# Patient Record
Sex: FEMALE
[De-identification: names, ages, dates, MRNs, and addresses within clinical notes are randomized per-mention and may not be internally consistent; named-entity substitution may affect disease eponyms.]

---
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
## 2020-02-20 ENCOUNTER — APPOINTMENT (OUTPATIENT)
Dept: PEDIATRICS | Facility: CLINIC | Age: 20
End: 2020-02-20
Payer: COMMERCIAL

## 2020-02-20 ENCOUNTER — RESULT CHARGE (OUTPATIENT)
Age: 20
End: 2020-02-20

## 2020-02-20 VITALS — TEMPERATURE: 100.6 F

## 2020-02-20 PROCEDURE — 99214 OFFICE O/P EST MOD 30 MIN: CPT

## 2020-02-20 PROCEDURE — 87804 INFLUENZA ASSAY W/OPTIC: CPT | Mod: QW

## 2020-02-20 PROCEDURE — 87880 STREP A ASSAY W/OPTIC: CPT | Mod: QW

## 2020-02-20 RX ORDER — AMOXICILLIN AND CLAVULANATE POTASSIUM 500; 125 MG/1; MG/1
500-125 TABLET, FILM COATED ORAL
Qty: 1 | Refills: 0 | Status: COMPLETED | COMMUNITY
Start: 2019-11-08 | End: 2020-02-20

## 2020-02-20 NOTE — REVIEW OF SYSTEMS
[Fever] : fever [Headache] : headache [Nasal Discharge] : nasal discharge [Nasal Congestion] : nasal congestion [Sore Throat] : sore throat [Cough] : cough [Appetite Changes] : appetite changes [Congestion] : congestion [Myalgia] : myalgia [Negative] : Genitourinary

## 2020-02-20 NOTE — PHYSICAL EXAM
[No Acute Distress] : no acute distress [Clear Rhinorrhea] : clear rhinorrhea [Erythematous Oropharynx] : erythematous oropharynx [Clear to Auscultation Bilaterally] : clear to auscultation bilaterally [Soft] : soft [NonTender] : non tender [Moves All Extremities x 4] : moves all extremities x4 [FreeTextEntry7] : harsh cough intermittent coarse breath sounds [NL] : warm

## 2020-02-20 NOTE — DISCUSSION/SUMMARY
[FreeTextEntry1] : 20-year-old with upper respiratory symptoms, sore throat headache fever. Cough consistent with bronchitis. Pharyngitis on exam but rapid strep test was negative. Patient is positive for influenza A.. Recommend supportive care including antipyretics, fluids, and nasal saline followed by nasal suction. Discussed risks/benefits of Tamiflu.does not wish to have prescription for Tamiflu. advised treatment of URI by using normal saline drops ,May use over-the-counter cough and cold preparations as needed humidifier, steam, and increasing fluids.Tylenol Motrin for fever and sore throat pain and muscle aches. Advise warm salt water gargles as needed for sore throat, half a teaspoon of salt to 1 cup of warm water gargle as desired. Advise not to snare glasses or eating utensils and to wash hands frequently. patient is not to return to school until fever free for 24 hours if there is no improvement in pain fever etc. in 2 days patient is to return to office may give Tylenol or Motrin for fever and/or pain Tylenol as every 4 hours ibuprofen would be every 6-8 hours. Increase fluids. May lubricate throat with drinking fluids sore throat lozenges and sucking candies. \par \par \par

## 2020-02-20 NOTE — HISTORY OF PRESENT ILLNESS
[FreeTextEntry6] : 20-year-old female here for complaint of fever for 2 days. Loose cough .muscle aches. Temperature max was 102 yesterday. 100.6 today. Patient has a very harsh, productive cough. She has been exposed to strep and influenza. Also with nasal congestion and runny nose headache and sore throat.

## 2020-02-23 LAB — BACTERIA THROAT CULT: NORMAL

## 2020-08-06 RX ORDER — AZITHROMYCIN 250 MG/1
250 TABLET, FILM COATED ORAL
Qty: 1 | Refills: 0 | Status: COMPLETED | COMMUNITY
Start: 2020-02-20 | End: 2020-08-06

## 2020-08-08 ENCOUNTER — APPOINTMENT (OUTPATIENT)
Dept: PEDIATRICS | Facility: CLINIC | Age: 20
End: 2020-08-08
Payer: COMMERCIAL

## 2020-08-08 VITALS
TEMPERATURE: 98 F | DIASTOLIC BLOOD PRESSURE: 62 MMHG | BODY MASS INDEX: 22.63 KG/M2 | WEIGHT: 118.3 LBS | SYSTOLIC BLOOD PRESSURE: 108 MMHG | HEART RATE: 74 BPM | HEIGHT: 60.5 IN | RESPIRATION RATE: 18 BRPM

## 2020-08-08 DIAGNOSIS — R05 COUGH: ICD-10-CM

## 2020-08-08 DIAGNOSIS — B27.90 INFECTIOUS MONONUCLEOSIS, UNSPECIFIED W/OUT COMPLICATION: ICD-10-CM

## 2020-08-08 DIAGNOSIS — J34.89 NASAL CONGESTION: ICD-10-CM

## 2020-08-08 DIAGNOSIS — R50.9 FEVER, UNSPECIFIED: ICD-10-CM

## 2020-08-08 DIAGNOSIS — J10.1 INFLUENZA DUE TO OTHER IDENTIFIED INFLUENZA VIRUS WITH OTHER RESPIRATORY MANIFESTATIONS: ICD-10-CM

## 2020-08-08 DIAGNOSIS — Z20.818 CONTACT WITH AND (SUSPECTED) EXPOSURE TO OTHER BACTERIAL COMMUNICABLE DISEASES: ICD-10-CM

## 2020-08-08 DIAGNOSIS — Z87.898 PERSONAL HISTORY OF OTHER SPECIFIED CONDITIONS: ICD-10-CM

## 2020-08-08 DIAGNOSIS — R09.81 NASAL CONGESTION: ICD-10-CM

## 2020-08-08 DIAGNOSIS — Z87.09 PERSONAL HISTORY OF OTHER DISEASES OF THE RESPIRATORY SYSTEM: ICD-10-CM

## 2020-08-08 DIAGNOSIS — Z20.828 CONTACT WITH AND (SUSPECTED) EXPOSURE TO OTHER VIRAL COMMUNICABLE DISEASES: ICD-10-CM

## 2020-08-08 DIAGNOSIS — F32.0 MAJOR DEPRESSIVE DISORDER, SINGLE EPISODE, MILD: ICD-10-CM

## 2020-08-08 DIAGNOSIS — J35.1 HYPERTROPHY OF TONSILS: ICD-10-CM

## 2020-08-08 DIAGNOSIS — Z87.39 PERSONAL HISTORY OF OTHER DISEASES OF THE MUSCULOSKELETAL SYSTEM AND CONNECTIVE TISSUE: ICD-10-CM

## 2020-08-08 DIAGNOSIS — Z00.00 ENCOUNTER FOR GENERAL ADULT MEDICAL EXAMINATION W/OUT ABNORMAL FINDINGS: ICD-10-CM

## 2020-08-08 DIAGNOSIS — L60.0 INGROWING NAIL: ICD-10-CM

## 2020-08-08 DIAGNOSIS — J01.10 ACUTE FRONTAL SINUSITIS, UNSPECIFIED: ICD-10-CM

## 2020-08-08 DIAGNOSIS — Z86.69 PERSONAL HISTORY OF OTHER DISEASES OF THE NERVOUS SYSTEM AND SENSE ORGANS: ICD-10-CM

## 2020-08-08 DIAGNOSIS — L03.039 CELLULITIS OF UNSPECIFIED TOE: ICD-10-CM

## 2020-08-08 PROCEDURE — 96127 BRIEF EMOTIONAL/BEHAV ASSMT: CPT

## 2020-08-08 PROCEDURE — 92551 PURE TONE HEARING TEST AIR: CPT

## 2020-08-08 PROCEDURE — 96160 PT-FOCUSED HLTH RISK ASSMT: CPT | Mod: 59

## 2020-08-08 PROCEDURE — 99395 PREV VISIT EST AGE 18-39: CPT

## 2020-08-08 RX ORDER — FLUOXETINE HYDROCHLORIDE 20 MG/1
20 CAPSULE ORAL
Qty: 30 | Refills: 0 | Status: COMPLETED | COMMUNITY
Start: 2019-09-25 | End: 2020-08-08

## 2020-08-08 RX ORDER — FLUOXETINE HYDROCHLORIDE 10 MG/1
10 CAPSULE ORAL
Qty: 30 | Refills: 0 | Status: COMPLETED | COMMUNITY
Start: 2019-08-29 | End: 2020-08-08

## 2020-08-08 RX ORDER — DAPSONE 50 MG/G
5 GEL TOPICAL
Qty: 60 | Refills: 0 | Status: COMPLETED | COMMUNITY
Start: 2019-01-09 | End: 2020-08-08

## 2020-08-08 NOTE — DISCUSSION/SUMMARY
[FreeTextEntry1] : Patient is a 20-year-old female who was doing well and is here for a routine visit. She has a history of mild anxiety and depression for which she is followed by psychiatrist every 3 months and a counselor at therapy sessions once a week. She feels this is all helpful but at times still feels a little anxious and sad. She had one episode in the past of suicide attempt but none recently.. She has a history of pulmonary emboli this has been stable. 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 Immunizations are up to date. She is currently in college and doing well. She is sexually active but only uses condoms as hormonal therapy is contraindicated. Also followed by a GYN as needed.. 20 year female here for well visit. Normal growth and development observed. Recommend balanced diet with all food groups. Brush teeth twice daily and recommend visiting dentist twice per year for cleaning. Maintain consistent daily routines and sleep schedule. Personal hygiene, puberty, and sexual health reviewed. Risky behaviors assessed. School progress discussed. Limit screen time to less than 2 hours per day. Encourage physical activity.  Return 1 year for routine well visit.\par I recommended that the patient participates in 60 minutes or more of physical activity a day.  As an older child, I encouraged structured physical activity when possible (ie, participation in team or individual sports, or supervised exercise sessions). I explained that the patient would be more likely to participate consistently in these activities because they would be accountable to a  or leader. I also suggested engaging in a gym or fitness center if possible.  Educational material relating to physical activity was provided to the patient.\par \par

## 2020-08-08 NOTE — HISTORY OF PRESENT ILLNESS
[Up to date] : Up to date [LMP: _____] : LMP: [unfilled] [Normal] : normal [Cycle Length: _____ days] : Cycle Length: [unfilled] days [Days of Bleeding: _____] : Days of bleeding: [unfilled] [Painful Cramps] : painful cramps [Acne] : acne [Has family members/adults to turn to for help] : has family members/adults to turn to for help [Eats meals with family] : eats meals with family [Is permitted and is able to make independent decisions] : Is permitted and is able to make independent decisions [Normal Performance] : normal performance [Grade: ____] : Grade: [unfilled] [Eats regular meals including adequate fruits and vegetables] : eats regular meals including adequate fruits and vegetables [Drinks non-sweetened liquids] : drinks non-sweetened liquids  [Normal Behavior/Attention] : normal behavior/attention [Normal Homework] : normal homework [Has friends] : has friends [Calcium source] : calcium source [At least 1 hour of physical activity a day] : at least 1 hour of physical activity a day [Has interests/participates in community activities/volunteers] : has interests/participates in community activities/volunteers. [Exposure to alcohol] : exposure to alcohol [Drinks alcohol] : drinks alcohol [Uses safety belts/safety equipment] : uses safety belts/safety equipment  [No] : No cigarette smoke exposure [Has peer relationships free of violence] : has peer relationships free of violence [Yes] : Patient has had sexual intercourse. [Has ways to cope with stress] : has ways to cope with stress [Displays self-confidence] : displays self-confidence [Gets depressed, anxious, or irritable/has mood swings] : gets depressed, anxious, or irritable/has mood swings [With Teen] : teen [Irregular menses] : no irregular menses [Heavy Bleeding] : no heavy bleeding [Sleep Concerns] : no sleep concerns [Hirsutism] : no hirsutism [Screen time (except homework) less than 2 hours a day] : no screen time (except homework) less than 2 hours a day [Has concerns about body or appearance] : does not have concerns about body or appearance [Uses electronic nicotine delivery system] : does not use electronic nicotine delivery system [Exposure to tobacco] : no exposure to tobacco [Exposure to electronic nicotine delivery system] : no exposure to electronic nicotine delivery system [Uses tobacco] : does not use tobacco [Exposure to drugs] : no exposure to drugs [Uses drugs] : does not use drugs  [Has problems with sleep] : does not have problems with sleep [Impaired/distracted driving] : no impaired/distracted driving [de-identified] : none [Has thought about hurting self or considered suicide] : has not thought about hurting self or considered suicide [FreeTextEntry7] : 20 year old doing well [FreeTextEntry8] : cramps relieved with Advil [de-identified] : rarely drinks [de-identified] : condoms, cannot use ocp [de-identified] : sees therapist once a week [FreeTextEntry1] : 20-year-old female doing well here for routine visit. Patient has a history of acne for which he is using some skin care products. She is followed by ophthalmology. She is also currently receiving therapy for mild anxiety and depression. She is on fluoxetine 60 mg q. day. She feels this is helpful. She sees a psychiatrist once every 3 months. She sees her counselor for therapy sessions once a week.

## 2020-08-08 NOTE — RISK ASSESSMENT
[0] : 1) Little interest or pleasure doing things: Not at all (0) [FreeTextEntry1] : occasional, brittonenlty on meds and sees therpaist

## 2020-08-08 NOTE — PHYSICAL EXAM
[Alert] : alert [No Acute Distress] : no acute distress [EOMI Bilateral] : EOMI bilateral [Normocephalic] : normocephalic [Nonerythematous Oropharynx] : nonerythematous oropharynx [Clear tympanic membranes with bony landmarks and light reflex present bilaterally] : clear tympanic membranes with bony landmarks and light reflex present bilaterally  [Pink Nasal Mucosa] : pink nasal mucosa [No Palpable Masses] : no palpable masses [Supple, full passive range of motion] : supple, full passive range of motion [Clear to Auscultation Bilaterally] : clear to auscultation bilaterally [Regular Rate and Rhythm] : regular rate and rhythm [Normal S1, S2 audible] : normal S1, S2 audible [No Murmurs] : no murmurs [Soft] : soft [NonTender] : non tender [+2 Femoral Pulses] : +2 femoral pulses [No Hepatomegaly] : no hepatomegaly [Normoactive Bowel Sounds] : normoactive bowel sounds [Non Distended] : non distended [Jona: ____] : Jona [unfilled] [No Splenomegaly] : no splenomegaly [No Gait Asymmetry] : no gait asymmetry [No Abnormal Lymph Nodes Palpated] : no abnormal lymph nodes palpated [Normal Muscle Tone] : normal muscle tone [No pain or deformities with palpation of bone, muscles, joints] : no pain or deformities with palpation of bone, muscles, joints [Straight] : straight [No Scoliosis] : no scoliosis [No Rash or Lesions] : no rash or lesions [Cranial Nerves Grossly Intact] : cranial nerves grossly intact

## 2020-11-09 ENCOUNTER — NON-APPOINTMENT (OUTPATIENT)
Age: 20
End: 2020-11-09

## 2021-02-16 DIAGNOSIS — R79.89 OTHER SPECIFIED ABNORMAL FINDINGS OF BLOOD CHEMISTRY: ICD-10-CM

## 2021-02-26 ENCOUNTER — APPOINTMENT (OUTPATIENT)
Dept: PEDIATRICS | Facility: CLINIC | Age: 21
End: 2021-02-26
Payer: COMMERCIAL

## 2021-02-26 VITALS
DIASTOLIC BLOOD PRESSURE: 78 MMHG | SYSTOLIC BLOOD PRESSURE: 116 MMHG | OXYGEN SATURATION: 100 % | TEMPERATURE: 98.8 F | HEART RATE: 98 BPM | RESPIRATION RATE: 18 BRPM

## 2021-02-26 DIAGNOSIS — Z20.822 CONTACT WITH AND (SUSPECTED) EXPOSURE TO COVID-19: ICD-10-CM

## 2021-02-26 DIAGNOSIS — T88.7XXA UNSPECIFIED ADVERSE EFFECT OF DRUG OR MEDICAMENT, INITIAL ENCOUNTER: ICD-10-CM

## 2021-02-26 DIAGNOSIS — R45.0 NERVOUSNESS: ICD-10-CM

## 2021-02-26 PROCEDURE — 99214 OFFICE O/P EST MOD 30 MIN: CPT

## 2021-02-26 PROCEDURE — 99072 ADDL SUPL MATRL&STAF TM PHE: CPT

## 2021-02-26 NOTE — PHYSICAL EXAM
[EOMI] : EOMI [NL] : warm [FreeTextEntry1] : jittery on exam  [FreeTextEntry5] : fundoscopic normal bl  / PERRLA BL  [de-identified] :  EYES: PERRLA bl, EOM intact bl, CN2-12 grossly intact/ strength 5/5 upper and lower extermities/ Romberg negative/ FInger to nose normal/ Gait and heel to toe walk normal

## 2021-02-26 NOTE — HISTORY OF PRESENT ILLNESS
[FreeTextEntry6] : 21 year old female presents today with being dizzy, nauseous, and headaches x 2weeks  Patient recently had her anxiety medication dose increased by her NP that was when the symptoms started.  AT ONE POINT COULD NOT DRIVE HER CARE FROM DIZZINESS  Her NP wants her to stay on the medication but she does not want to. (80mg and 100mg ) Patient did not take medication today  but still feels dizzy and past 2 days having jitteriness   NO cold intolerances and does not have a chill\par . Patient was also possible exposed to COVID. The aid tested positive on friday 2/19, her mother was tested monday 2/22 and was negative. Mother started having symptoms of fever so she was retested today. Patient is afebrile.  Patient having altered sense of taste only with above issues

## 2021-02-26 NOTE — DISCUSSION/SUMMARY
[FreeTextEntry1] : conor on dizziness/ jitterines-  vitals stable/   COVI  EXPOSURE \par sent for labs\par patient needs to contact her [facility] NP for side efects of medicaitons\par Due to recent exposure  patient possibly has COVID-19 Infection. Signs and symptoms discussed with patient. Patient educated to self isolate in a room in his/her home away from others in household. MUST wear Mask if in public areas of house- in bedroom can open window and not wear mask- if sleeps alone. Patient advised not to leave house for any reason. After using bathroom advise to clean area and after eating and drinking utensils, plates, cups etc needs to be washed \par Self treatment discussed including acetaminophen for fever, pain or myalgia; cough/cold medications for symptoms. Patient to check temperature daily. Monitor symptoms.\par WILL call with COVID PCR results once available \par \par \par

## 2021-02-26 NOTE — REVIEW OF SYSTEMS
[Headache] : headache [Abnormal Movements] : abnormal movements [Lightheadness] : lightheadness [Dizziness] : dizziness [Negative] : Genitourinary [Eye Discharge] : no eye discharge [Ear Pain] : no ear pain [Nasal Discharge] : no nasal discharge [Sinus Pressure] : no sinus pressure [Sore Throat] : no sore throat [Seizure] : no seizures [Weakness] : no weakness

## 2021-02-28 ENCOUNTER — NON-APPOINTMENT (OUTPATIENT)
Age: 21
End: 2021-02-28

## 2021-02-28 LAB — SARS-COV-2 N GENE NPH QL NAA+PROBE: NOT DETECTED

## 2021-04-12 ENCOUNTER — NON-APPOINTMENT (OUTPATIENT)
Age: 21
End: 2021-04-12

## 2021-04-12 LAB
25(OH)D3 SERPL-MCNC: 17.9 NG/ML
ALBUMIN SERPL ELPH-MCNC: 4.3 G/DL
ALP BLD-CCNC: 75 U/L
ALT SERPL-CCNC: 10 U/L
ANION GAP SERPL CALC-SCNC: 9 MMOL/L
APPEARANCE: CLEAR
AST SERPL-CCNC: 17 U/L
BACTERIA: NEGATIVE
BASOPHILS # BLD AUTO: 0.04 K/UL
BASOPHILS NFR BLD AUTO: 0.7 %
BILIRUB SERPL-MCNC: 0.6 MG/DL
BILIRUBIN URINE: NEGATIVE
BLOOD URINE: NEGATIVE
BUN SERPL-MCNC: 10 MG/DL
CALCIUM SERPL-MCNC: 9.5 MG/DL
CHLORIDE SERPL-SCNC: 107 MMOL/L
CHOLEST SERPL-MCNC: 139 MG/DL
CO2 SERPL-SCNC: 23 MMOL/L
COLOR: YELLOW
CREAT SERPL-MCNC: 0.79 MG/DL
EOSINOPHIL # BLD AUTO: 0.03 K/UL
EOSINOPHIL NFR BLD AUTO: 0.5 %
GLUCOSE QUALITATIVE U: NEGATIVE
GLUCOSE SERPL-MCNC: 90 MG/DL
HCT VFR BLD CALC: 36.1 %
HDLC SERPL-MCNC: 54 MG/DL
HGB BLD-MCNC: 11.3 G/DL
HYALINE CASTS: 4 /LPF
IMM GRANULOCYTES NFR BLD AUTO: 0.3 %
KETONES URINE: NEGATIVE
LDLC SERPL CALC-MCNC: 74 MG/DL
LEUKOCYTE ESTERASE URINE: NEGATIVE
LYMPHOCYTES # BLD AUTO: 2 K/UL
LYMPHOCYTES NFR BLD AUTO: 32.9 %
MAN DIFF?: NORMAL
MCHC RBC-ENTMCNC: 27.8 PG
MCHC RBC-ENTMCNC: 31.3 GM/DL
MCV RBC AUTO: 88.9 FL
MICROSCOPIC-UA: NORMAL
MONOCYTES # BLD AUTO: 0.38 K/UL
MONOCYTES NFR BLD AUTO: 6.3 %
NEUTROPHILS # BLD AUTO: 3.6 K/UL
NEUTROPHILS NFR BLD AUTO: 59.3 %
NITRITE URINE: NEGATIVE
NONHDLC SERPL-MCNC: 85 MG/DL
PH URINE: 6
PLATELET # BLD AUTO: 241 K/UL
POTASSIUM SERPL-SCNC: 4.5 MMOL/L
PROT SERPL-MCNC: 6.6 G/DL
PROTEIN URINE: NORMAL
RBC # BLD: 4.06 M/UL
RBC # FLD: 12.9 %
RED BLOOD CELLS URINE: 4 /HPF
SODIUM SERPL-SCNC: 139 MMOL/L
SPECIFIC GRAVITY URINE: 1.03
SQUAMOUS EPITHELIAL CELLS: 4 /HPF
TRIGL SERPL-MCNC: 58 MG/DL
UROBILINOGEN URINE: NORMAL
WBC # FLD AUTO: 6.07 K/UL
WHITE BLOOD CELLS URINE: 1 /HPF

## 2021-04-14 LAB
T3 SERPL-MCNC: 99 NG/DL
T4 SERPL-MCNC: 4.4 UG/DL
TSH SERPL-ACNC: 1.21 UIU/ML
VIT B12 SERPL-MCNC: 939 PG/ML

## 2021-04-19 LAB — VIT B6 SERPL-MCNC: 9.4 UG/L

## 2021-07-23 ENCOUNTER — NON-APPOINTMENT (OUTPATIENT)
Age: 21
End: 2021-07-23

## 2021-07-24 ENCOUNTER — APPOINTMENT (OUTPATIENT)
Dept: PEDIATRICS | Facility: CLINIC | Age: 21
End: 2021-07-24
Payer: COMMERCIAL

## 2021-07-24 ENCOUNTER — LABORATORY RESULT (OUTPATIENT)
Age: 21
End: 2021-07-24

## 2021-07-24 ENCOUNTER — NON-APPOINTMENT (OUTPATIENT)
Age: 21
End: 2021-07-24

## 2021-07-24 VITALS — SYSTOLIC BLOOD PRESSURE: 112 MMHG | DIASTOLIC BLOOD PRESSURE: 66 MMHG | TEMPERATURE: 98.3 F

## 2021-07-24 DIAGNOSIS — R42 DIZZINESS AND GIDDINESS: ICD-10-CM

## 2021-07-24 LAB
ALBUMIN SERPL ELPH-MCNC: 4.8 G/DL
ALP BLD-CCNC: 84 U/L
ALT SERPL-CCNC: 10 U/L
ANION GAP SERPL CALC-SCNC: 11 MMOL/L
APPEARANCE: ABNORMAL
AST SERPL-CCNC: 17 U/L
BASOPHILS # BLD AUTO: 0.03 K/UL
BASOPHILS NFR BLD AUTO: 0.5 %
BILIRUB SERPL-MCNC: 1 MG/DL
BILIRUBIN URINE: NEGATIVE
BLOOD URINE: NEGATIVE
BUN SERPL-MCNC: 8 MG/DL
CALCIUM SERPL-MCNC: 10.3 MG/DL
CHLORIDE SERPL-SCNC: 104 MMOL/L
CHOLEST SERPL-MCNC: 147 MG/DL
CO2 SERPL-SCNC: 25 MMOL/L
COLOR: YELLOW
CREAT SERPL-MCNC: 0.83 MG/DL
EOSINOPHIL # BLD AUTO: 0.01 K/UL
EOSINOPHIL NFR BLD AUTO: 0.2 %
GLUCOSE QUALITATIVE U: NEGATIVE
GLUCOSE SERPL-MCNC: 102 MG/DL
HCT VFR BLD CALC: 40.7 %
HDLC SERPL-MCNC: 62 MG/DL
HGB BLD-MCNC: 12.8 G/DL
IMM GRANULOCYTES NFR BLD AUTO: 0.2 %
KETONES URINE: NEGATIVE
LDLC SERPL CALC-MCNC: 74 MG/DL
LEUKOCYTE ESTERASE URINE: NEGATIVE
LYMPHOCYTES # BLD AUTO: 1.76 K/UL
LYMPHOCYTES NFR BLD AUTO: 29.1 %
MAN DIFF?: NORMAL
MCHC RBC-ENTMCNC: 27.5 PG
MCHC RBC-ENTMCNC: 31.4 GM/DL
MCV RBC AUTO: 87.5 FL
MONOCYTES # BLD AUTO: 0.33 K/UL
MONOCYTES NFR BLD AUTO: 5.5 %
NEUTROPHILS # BLD AUTO: 3.9 K/UL
NEUTROPHILS NFR BLD AUTO: 64.5 %
NITRITE URINE: NEGATIVE
NONHDLC SERPL-MCNC: 85 MG/DL
PH URINE: 6
PLATELET # BLD AUTO: 243 K/UL
POTASSIUM SERPL-SCNC: 5.2 MMOL/L
PROT SERPL-MCNC: 7.3 G/DL
PROTEIN URINE: NORMAL
RBC # BLD: 4.65 M/UL
RBC # FLD: 13 %
S PYO AG SPEC QL IA: NORMAL
SODIUM SERPL-SCNC: 141 MMOL/L
SPECIFIC GRAVITY URINE: 1.03
T3 SERPL-MCNC: 117 NG/DL
T4 SERPL-MCNC: 6.2 UG/DL
TRIGL SERPL-MCNC: 56 MG/DL
TSH SERPL-ACNC: 1.29 UIU/ML
UROBILINOGEN URINE: NORMAL
WBC # FLD AUTO: 6.04 K/UL

## 2021-07-24 PROCEDURE — 99072 ADDL SUPL MATRL&STAF TM PHE: CPT

## 2021-07-24 PROCEDURE — 87880 STREP A ASSAY W/OPTIC: CPT | Mod: QW

## 2021-07-24 PROCEDURE — 99214 OFFICE O/P EST MOD 30 MIN: CPT | Mod: 25

## 2021-07-24 RX ORDER — HYDROXYZINE HYDROCHLORIDE 25 MG/1
25 TABLET ORAL
Qty: 90 | Refills: 0 | Status: DISCONTINUED | COMMUNITY
Start: 2019-07-18 | End: 2021-07-24

## 2021-07-24 RX ORDER — FLUOXETINE HYDROCHLORIDE 20 MG/1
20 CAPSULE ORAL
Refills: 0 | Status: DISCONTINUED | COMMUNITY
End: 2021-07-24

## 2021-07-24 NOTE — REVIEW OF SYSTEMS
[Appetite Changes] : appetite changes [Negative] : Genitourinary [Vomiting] : no vomiting [Diarrhea] : no diarrhea [FreeTextEntry1] : nausea

## 2021-07-24 NOTE — DISCUSSION/SUMMARY
[FreeTextEntry1] : This patient has been diagnosed with a Viral Syndrome/ nausea/ pharyngitis.\par Rapid strep was negative.\par sent for blood work ro ebv reactivation.\par No Covid done, will have rapid on Monday if not better.\par For nausea to try to eat and to take Mylanta 4 x a day and try gingerale.\par Denies Sexual activity.\par The Parent was  advised to use saline nose drops and symptomatic relief  if indicated to alleviate symptoms. May use OTC products if age appropriate.\par Advised to encourage fluids and to monitor for fever. Should temperature develop and symptoms worsen or fail to improve over the next 48-72 hours parents are  to contact the office.for further evaluation.\par \par

## 2021-07-24 NOTE — PHYSICAL EXAM
[Erythematous Oropharynx] : erythematous oropharynx [NL] : warm [de-identified] : has 1 tonsil enlarged on the left, no pus, hx of ebv in the past

## 2021-07-24 NOTE — HISTORY OF PRESENT ILLNESS
[FreeTextEntry6] : 21 year old female presents today with nausea for one week. Patient also stated she felt like her throat was swollen. Patient is afebrile.  Denies Ha bellyache , no diarhea or fever , no sick or covid contacts . She is fully vaccinated.\par has had no apatite and feels like  she will pass out ay times , does not happen every day,\par

## 2021-07-26 ENCOUNTER — NON-APPOINTMENT (OUTPATIENT)
Age: 21
End: 2021-07-26

## 2021-07-26 LAB
BACTERIA THROAT CULT: NORMAL
EBV EA AB SER IA-ACNC: <5 U/ML
EBV EA AB TITR SER IF: POSITIVE
EBV EA IGG SER QL IA: 100 U/ML
EBV EA IGG SER-ACNC: NEGATIVE
EBV EA IGM SER IA-ACNC: NEGATIVE
EBV PATRN SPEC IB-IMP: NORMAL
EBV VCA IGG SER IA-ACNC: 111 U/ML
EBV VCA IGM SER QL IA: 20.2 U/ML
EPSTEIN-BARR VIRUS CAPSID ANTIGEN IGG: POSITIVE
ERYTHROCYTE [SEDIMENTATION RATE] IN BLOOD BY WESTERGREN METHOD: 3 MM/HR

## 2021-07-28 ENCOUNTER — APPOINTMENT (OUTPATIENT)
Dept: PEDIATRICS | Facility: CLINIC | Age: 21
End: 2021-07-28
Payer: COMMERCIAL

## 2021-07-28 VITALS — TEMPERATURE: 99 F

## 2021-07-28 DIAGNOSIS — R11.0 NAUSEA: ICD-10-CM

## 2021-07-28 DIAGNOSIS — F41.9 ANXIETY DISORDER, UNSPECIFIED: ICD-10-CM

## 2021-07-28 DIAGNOSIS — R19.7 DIARRHEA, UNSPECIFIED: ICD-10-CM

## 2021-07-28 DIAGNOSIS — J02.9 ACUTE PHARYNGITIS, UNSPECIFIED: ICD-10-CM

## 2021-07-28 PROCEDURE — ZZZZZ: CPT

## 2021-07-28 PROCEDURE — 99214 OFFICE O/P EST MOD 30 MIN: CPT

## 2021-07-28 RX ORDER — LAMOTRIGINE 100 MG/1
100 TABLET ORAL
Qty: 15 | Refills: 0 | Status: COMPLETED | COMMUNITY
Start: 2021-02-11

## 2021-07-28 NOTE — DISCUSSION/SUMMARY
[FreeTextEntry1] : . 21-year-old female with nausea for 10 days. Reviewed all lab work with patient and mother. Reviewed all previous notes regarding this illness with patient and mother. Discussed other possible etiologies such as gastroesophageal reflux. Advised trial of Mylanta or Pepto-Bismol or omeprazole. [place] diet smaller more frequent meals. Possibility of h. pylori discussed. Patient had onset of symptoms after eating at a restaurant. Feels that the food did not taste right. Ordered a stool antigen for H. pylori. Patient has had some mild pharyngitis as well as swollen tonsil but strep test was negative and patient  has no sore throat symptoms. Also referred to GI for further evaluation and workup. Patient feels that her anxiety symptoms have worsened as a result of this. She does not feel that anxiety could have been the cause as it was not as much of an issue before as it is now. Discussed calming techniques etc.  Discussed signs and symptoms associated with possible COVID infection as well as possibility of having asymptomatic carriage.Incubation times, exposure timeline discussed. Discussed restrictions and Quarantine times pending lab results. Nasopharyngeal swab performed for COVID 19. When results are received will contact patient regarding back to school information etc.\par Total time dedicated to this patient visit including preparing to see the patient(e.g. review of chart, any pertinent labs etc.) obtaining  and or reviewing separately obtained history,performing medical exam,evaluation,counseling and educating patient and parent,ordering any needed medications or labs,documenting clinical information in the electronic medical record to patient/parent --32-----minutes\par \par

## 2021-07-28 NOTE — HISTORY OF PRESENT ILLNESS
[FreeTextEntry6] : 21 year old female presents today for a COVID test. Patient has been nauseous foe 10 days and has swollen tonsils. Patient was test for strep on 7/24 results were negative. Patient temp in office is 99.\par Reviewed previous visit and lab work.\par Patient states that she remembers eating at a restaurant and the food  did not taste right when symptoms first began. She actually became nauseous at restaurant. Over the next 10 days nausea continued. Occasional dizziness and feeling weak. No fever. Some loose stools today. No constipation or vomiting. No reflux-like symptoms. She was seen on July 24 in the office. Diagnoses possible viral syndrome. Good work was ordered. Throat culture was done. Results negative. Appetite has decreased. Child is able to drink. Denies sore throat or abdominal pain.

## 2021-07-28 NOTE — PHYSICAL EXAM
[Erythematous Oropharynx] : erythematous oropharynx [Enlarged Tonsils] : enlarged tonsils  [Soft] : soft [NL] : warm

## 2021-07-28 NOTE — REVIEW OF SYSTEMS
[Appetite Changes] : appetite changes [PO Intolerance] : PO intolerance [Diarrhea] : diarrhea [Dizziness] : dizziness [Negative] : Genitourinary [Vomiting] : no vomiting [Constipation] : no constipation [Gaseous] : not gaseous [Abdominal Pain] : no abdominal pain

## 2021-07-29 LAB — SARS-COV-2 N GENE NPH QL NAA+PROBE: NOT DETECTED

## 2021-07-30 ENCOUNTER — NON-APPOINTMENT (OUTPATIENT)
Age: 21
End: 2021-07-30

## 2021-07-30 LAB — GI PCR PANEL, STOOL: NORMAL

## 2021-08-03 ENCOUNTER — NON-APPOINTMENT (OUTPATIENT)
Age: 21
End: 2021-08-03

## 2021-08-03 LAB — H PYLORI AG STL QL: NOT DETECTED

## 2022-01-17 ENCOUNTER — APPOINTMENT (OUTPATIENT)
Dept: PEDIATRICS | Facility: CLINIC | Age: 22
End: 2022-01-17
Payer: COMMERCIAL

## 2022-01-17 VITALS — TEMPERATURE: 99.2 F

## 2022-01-17 DIAGNOSIS — R51.9 HEADACHE, UNSPECIFIED: ICD-10-CM

## 2022-01-17 DIAGNOSIS — Z20.822 CONTACT WITH AND (SUSPECTED) EXPOSURE TO COVID-19: ICD-10-CM

## 2022-01-17 DIAGNOSIS — B34.9 VIRAL INFECTION, UNSPECIFIED: ICD-10-CM

## 2022-01-17 DIAGNOSIS — R07.0 PAIN IN THROAT: ICD-10-CM

## 2022-01-17 LAB — SARS-COV-2 AG RESP QL IA.RAPID: POSITIVE

## 2022-01-17 PROCEDURE — 87811 SARS-COV-2 COVID19 W/OPTIC: CPT | Mod: QW

## 2022-01-17 PROCEDURE — 99214 OFFICE O/P EST MOD 30 MIN: CPT | Mod: 25

## 2022-01-17 NOTE — PHYSICAL EXAM
[Clear to Auscultation Bilaterally] : clear to auscultation bilaterally [Soft] : soft [No Abnormal Lymph Nodes Palpated] : no abnormal lymph nodes palpated [Moves All Extremities x 4] : moves all extremities x4 [NL] : warm [de-identified] : mild redness of pharynx

## 2022-01-17 NOTE — DISCUSSION/SUMMARY
[FreeTextEntry1] : 21 yo female with exposure to COVID now with sx.\par Discussed signs and symptoms associated with possible COVID infection as well as possibility of having asymptomatic carriage.Incubation times, exposure timeline discussed. Discussed restrictions and Quarantine times pending lab results. Nasopharyngeal swab performed for COVID 19. When results are received will contact patient regarding back to school information etc.\par Rapid covid test faintly positive. PCR pending.

## 2022-01-17 NOTE — HISTORY OF PRESENT ILLNESS
[FreeTextEntry6] : 21 yo female presents with COVID exposure x 2 days. Afebrile. \par Patient was exposed to COVID 4 days ago. Boyfriend was at the home. Other members of the family exposed. Boyfriend went for testing 2 days ago. Received positive result yesterday.He was symptomatic for the past2 days.\par Patient developed scratchy throat, headache yesterday. Sx continue today. No fever. No cough at this time. Family members have no sx at this time.

## 2022-01-19 ENCOUNTER — NON-APPOINTMENT (OUTPATIENT)
Age: 22
End: 2022-01-19

## 2022-01-19 LAB — SARS-COV-2 N GENE NPH QL NAA+PROBE: DETECTED

## 2024-07-02 ENCOUNTER — NON-APPOINTMENT (OUTPATIENT)
Age: 24
End: 2024-07-02

## 2024-12-12 NOTE — PHYSICAL EXAM
[Conjunctiva Injected] : conjunctiva injected  [Mucoid Discharge] : mucoid discharge [NL] : nontender cervical lymph nodes, supple, full passive range of motion [Clear to Ausculatation Bilaterally] : clear to auscultation bilaterally Bill For Surgical Tray: no Billing Type: Third-Party Bill Performing Laboratory: 0 Expected Date Of Service: 12/12/2024